# Patient Record
Sex: FEMALE | Race: WHITE
[De-identification: names, ages, dates, MRNs, and addresses within clinical notes are randomized per-mention and may not be internally consistent; named-entity substitution may affect disease eponyms.]

---

## 2022-03-23 ENCOUNTER — HOSPITAL ENCOUNTER (OUTPATIENT)
Dept: HOSPITAL 79 - KOH-I | Age: 57
End: 2022-03-23
Attending: INTERNAL MEDICINE
Payer: COMMERCIAL

## 2022-03-23 DIAGNOSIS — R91.8: ICD-10-CM

## 2022-03-23 DIAGNOSIS — F17.210: Primary | ICD-10-CM

## 2022-04-27 ENCOUNTER — OFFICE VISIT (OUTPATIENT)
Dept: PULMONOLOGY | Facility: CLINIC | Age: 57
End: 2022-04-27

## 2022-04-27 VITALS
WEIGHT: 175 LBS | BODY MASS INDEX: 29.16 KG/M2 | SYSTOLIC BLOOD PRESSURE: 128 MMHG | OXYGEN SATURATION: 98 % | HEIGHT: 65 IN | HEART RATE: 117 BPM | TEMPERATURE: 97.8 F | DIASTOLIC BLOOD PRESSURE: 74 MMHG

## 2022-04-27 DIAGNOSIS — R06.02 SOB (SHORTNESS OF BREATH): Primary | ICD-10-CM

## 2022-04-27 DIAGNOSIS — R53.83 FATIGUE, UNSPECIFIED TYPE: ICD-10-CM

## 2022-04-27 DIAGNOSIS — F17.200 SMOKER: ICD-10-CM

## 2022-04-27 PROCEDURE — 99203 OFFICE O/P NEW LOW 30 MIN: CPT | Performed by: INTERNAL MEDICINE

## 2022-04-27 RX ORDER — AMLODIPINE BESYLATE 5 MG/1
5 TABLET ORAL DAILY
COMMUNITY
Start: 2022-04-25

## 2022-04-27 RX ORDER — NICOTINE POLACRILEX 4 MG/1
GUM, CHEWING ORAL
COMMUNITY
Start: 2022-03-30

## 2022-04-27 RX ORDER — CITALOPRAM 20 MG/1
20 TABLET ORAL DAILY
COMMUNITY
Start: 2022-04-15

## 2022-04-27 NOTE — PROGRESS NOTES
Mamta Soriano presents for the following No chief complaint on file.  .    History of Present Illness   Were you born premature?  no    Any Childhood infections? no      Breathing problems when you were a child? no    Any childhood allergies?    yes             At what age did you begin smoking? Late 20's    Smoking marijuana? no    Any IV drugs? no    How many packs per day? 1/2 pack dly    Lung Function Test? no  Chest X-Ray? no    CT Chest? yes Allergy Test? no    Family hx of Lung disease or Lung Cancer?yes    If FHx is posivitive for lung cancer, what is the relationship of the family member? paternal uncle    Any hospitalization in the last year? no    How far can you walk without getting short of breath?  To 50 feet    Any coughing? no    Any wheezing? no    Acid Reflux? yes    Do you snore? yes    Daytime Fatigue? yes    Any pets? no   Any pet allergies? 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 no    What inhalers are you currently using? None   Above-mentioned questionnaire w  as reviewed by me in great detail.  No plans to quit smoking.  Recently had a CT chest we will get a copy of the results.  Review of Systems  Positive for cough and shortness of breath.  14 point review system reviewed and otherwise negative  Act.  Will luci Problems:  Problem List Items Addressed This Visit        Pulmonary and Pneumonias    SOB (shortness of breath) - Primary    Relevant Orders    Full Pulmonary Function Test With Bronchodilator       Symptoms and Signs    Fatigue    Relevant Orders    Home Sleep Study       Tobacco    Smoker          Past Medical History:  Past Medical History:   Diagnosis Date   • GERD (gastroesophageal reflux disease)    • Hypertension        Family History:  Family History   Problem Relation Age of Onset   • Hypertension Mother    • No Known Problems Father    • No Known Problems Sister    • No Known Problems Brother   "      Social History:  Social History     Tobacco Use   • Smoking status: Current Every Day Smoker     Packs/day: 0.50     Years: 25.00     Pack years: 12.50     Types: Cigarettes     Start date: 1/1/1997   • Smokeless tobacco: Never Used   Substance Use Topics   • Alcohol use: Never       Current Medications:  Current Outpatient Medications   Medication Sig Dispense Refill   • amLODIPine (NORVASC) 5 MG tablet Take 5 mg by mouth Daily. for blood pressure     • citalopram (CeleXA) 20 MG tablet Take 20 mg by mouth Daily. as directed     • Omeprazole 20 MG tablet delayed-release TAKE ONE TABLET BY MOUTH EVERY DAY FOR THE STOMACH       No current facility-administered medications for this visit.       Allergies:  No Known Allergies    Vitals:  /74 (BP Location: Right arm, Patient Position: Sitting, Cuff Size: Adult)   Pulse 117   Temp 97.8 °F (36.6 °C) (Infrared)   Ht 165.1 cm (65\")   Wt 79.4 kg (175 lb)   SpO2 98%   BMI 29.12 kg/m²     Imaging:    Imaging Results (Most Recent)     None          Pulmonary Functions Testing Results:    No results found for: FEV1, FVC, QNQ6BHT, TLC, DLCO    No results found for this or any previous visit.    Objective   Physical Exam  General- normal in appearance, not in any acute distress    HEENT- pupils equally reactive to light, normal in size, no scleral icterus    Neck-supple    Respiratory-respirations normal-on auscultation no wheezing no crackles,     Cardiovascular-  Normal S1 and S2. No S3, S4 or murmurs. No JVD, no carotid bruit and no edema, pulses normal bilaterally     GI-nontender nondistended bowel sounds positive    CNS-nonfocal    Musculoskeletal -no edema  Extremities- no obvious deformity noticed     Psychiatric-mood good, good eye contact, alert awake oriented  Skin- no visible rash       Assessment/Plan   Shortness of breath-likely related.  Related to patient's underlying lung disease\" longstanding smoking history less likely has COPD and " deconditioning.  We will get CT scan report from [facility] done at [facility].  We will get PFTs to look for any obstructive or restrictive lung disease.  We will prescribe inhalers accordingly.  If shortness of breath continues to be evidence of any significant disease we will get 2D echo.    Advised her to stay active and do exercise to improve deconditioning.    Lung cancer screening- we will repeat low-dose CT chest in another 10 months.    Smoker-did extensive smoking cessation counseling over 10 minutes.  Patient is not ready to quit yet.    Daytime fatigue and tiredness- we will get home sleep study.     ICD-10-CM ICD-9-CM   1. SOB (shortness of breath)  R06.02 786.05   2. Smoker  F17.200 305.1   3. Fatigue, unspecified type  R53.83 780.79       Return in about 3 months (around 7/27/2022).

## 2022-04-28 PROBLEM — R53.83 FATIGUE: Status: ACTIVE | Noted: 2022-04-28

## 2022-04-28 PROBLEM — F17.200 SMOKER: Status: ACTIVE | Noted: 2022-04-28

## 2022-04-28 PROBLEM — R06.02 SOB (SHORTNESS OF BREATH): Status: ACTIVE | Noted: 2022-04-28

## 2022-05-02 ENCOUNTER — TELEPHONE (OUTPATIENT)
Dept: PULMONOLOGY | Facility: CLINIC | Age: 57
End: 2022-05-02

## 2022-05-02 NOTE — TELEPHONE ENCOUNTER
----- Message from Seth Garcia MD sent at 4/30/2022  6:44 AM EDT -----  Has reed  Let her know   And give her autopap with supplies.    Ty  ss  ----- Message -----  From: Benjamin Canales Incoming  Sent: 4/29/2022  11:51 AM EDT  To: Seth Garcia MD

## 2022-05-04 ENCOUNTER — TELEPHONE (OUTPATIENT)
Dept: PULMONOLOGY | Facility: CLINIC | Age: 57
End: 2022-05-04

## 2022-05-04 DIAGNOSIS — G47.33 OSA (OBSTRUCTIVE SLEEP APNEA): Primary | ICD-10-CM

## 2022-05-04 NOTE — TELEPHONE ENCOUNTER
----- Message from Jocelyn Cortes MA sent at 5/2/2022 12:56 PM EDT -----    ----- Message -----  From: Seth Garcia MD  Sent: 4/30/2022   6:46 AM EDT  To: Grace Gaming, Jocelyn Cortes MA    Has reed  Let her know   And give her autopap with supplies.    Ty  ss  ----- Message -----  From: Benjamin Canales Incoming  Sent: 4/29/2022  11:51 AM EDT  To: Seth Garcia MD

## 2022-05-18 ENCOUNTER — HOSPITAL ENCOUNTER (OUTPATIENT)
Dept: RESPIRATORY THERAPY | Facility: HOSPITAL | Age: 57
Discharge: HOME | End: 2022-05-18
Admitting: INTERNAL MEDICINE

## 2022-05-18 DIAGNOSIS — R06.02 SOB (SHORTNESS OF BREATH): ICD-10-CM

## 2022-05-18 PROCEDURE — 94729 DIFFUSING CAPACITY: CPT

## 2022-05-18 PROCEDURE — 94729 DIFFUSING CAPACITY: CPT | Performed by: INTERNAL MEDICINE

## 2022-05-18 PROCEDURE — 94664 DEMO&/EVAL PT USE INHALER: CPT

## 2022-05-18 PROCEDURE — 94060 EVALUATION OF WHEEZING: CPT

## 2022-05-18 PROCEDURE — 94060 EVALUATION OF WHEEZING: CPT | Performed by: INTERNAL MEDICINE

## 2022-05-18 PROCEDURE — 94726 PLETHYSMOGRAPHY LUNG VOLUMES: CPT | Performed by: INTERNAL MEDICINE

## 2022-05-18 PROCEDURE — 94726 PLETHYSMOGRAPHY LUNG VOLUMES: CPT

## 2022-05-18 PROCEDURE — 94640 AIRWAY INHALATION TREATMENT: CPT

## 2022-05-18 RX ORDER — ALBUTEROL SULFATE 2.5 MG/3ML
2.5 SOLUTION RESPIRATORY (INHALATION) ONCE
Status: COMPLETED | OUTPATIENT
Start: 2022-05-18 | End: 2022-05-18

## 2022-05-18 RX ADMIN — ALBUTEROL SULFATE 2.5 MG: 2.5 SOLUTION RESPIRATORY (INHALATION) at 11:32

## 2023-04-12 ENCOUNTER — OFFICE VISIT (OUTPATIENT)
Dept: PULMONOLOGY | Facility: CLINIC | Age: 58
End: 2023-04-12
Payer: MEDICAID

## 2023-04-12 VITALS
TEMPERATURE: 98.7 F | WEIGHT: 182 LBS | BODY MASS INDEX: 28.56 KG/M2 | SYSTOLIC BLOOD PRESSURE: 172 MMHG | DIASTOLIC BLOOD PRESSURE: 98 MMHG | HEIGHT: 67 IN | HEART RATE: 113 BPM | OXYGEN SATURATION: 98 %

## 2023-04-12 DIAGNOSIS — F17.200 SMOKER: ICD-10-CM

## 2023-04-12 DIAGNOSIS — G47.33 MILD OBSTRUCTIVE SLEEP APNEA: Primary | ICD-10-CM

## 2023-04-12 DIAGNOSIS — Z12.2 SCREENING FOR LUNG CANCER: ICD-10-CM

## 2023-04-12 DIAGNOSIS — E66.3 OVERWEIGHT: ICD-10-CM

## 2023-04-12 PROCEDURE — 99214 OFFICE O/P EST MOD 30 MIN: CPT | Performed by: INTERNAL MEDICINE

## 2023-04-12 RX ORDER — HYDROXYZINE HYDROCHLORIDE 25 MG/1
25-50 TABLET, FILM COATED ORAL EVERY 6 HOURS PRN
COMMUNITY
Start: 2023-03-06

## 2023-04-12 RX ORDER — LORATADINE 10 MG/1
10 TABLET ORAL DAILY
COMMUNITY
Start: 2023-03-06

## 2023-04-12 NOTE — PROGRESS NOTES
Interval history since last visit: Needs CPAP    Recent hospitalizations: None    Investigations (imaging, PFT's, labs, sleep study, record requests, etc.) None    Have you had the Influenza Vaccine? no   Would you like to receive this Vaccine today? no    Have you had the Pneumonia Vaccine?  no  Would you like to receive this Vaccine today? no    Subjective    Julieta Soriano presents for the following Mediastinal lymphadenopathy and Sleep Apnea      History of Present Illness   No hospitalization and last 1 year.  Continues to smoke 1 pack a day.  Review of Systems   Constitutional: Negative for activity change, fatigue and unexpected weight change.   HENT: Negative for congestion, postnasal drip and rhinorrhea.    Respiratory: Negative for apnea, cough, chest tightness, shortness of breath and wheezing.    Cardiovascular: Negative for chest pain and palpitations.   Gastrointestinal: Negative for nausea.   Allergic/Immunologic: Negative for environmental allergies.   Psychiatric/Behavioral: Negative for agitation and confusion.       Active Problems:  Problem List Items Addressed This Visit        Endocrine and Metabolic    Overweight       Sleep    Mild obstructive sleep apnea - Primary    Relevant Orders    Miscellaneous DME       Tobacco    Smoker    Relevant Orders     CT Chest Low Dose Cancer Screening WO       Other    Screening for lung cancer       Past Medical History:  Past Medical History:   Diagnosis Date   • GERD (gastroesophageal reflux disease)    • Hypertension        Family History:  Family History   Problem Relation Age of Onset   • Hypertension Mother    • No Known Problems Father    • Cancer Sister    • No Known Problems Brother        Social History:  Social History     Tobacco Use   • Smoking status: Every Day     Packs/day: 0.50     Types: Cigarettes     Start date: 1/1/1997     Passive exposure: Current   • Smokeless tobacco: Never   Substance Use Topics   • Alcohol use: Yes     Alcohol/week:  "8.0 standard drinks     Types: 8 Cans of beer per week       Current Medications:  Current Outpatient Medications   Medication Sig Dispense Refill   • amLODIPine (NORVASC) 10 MG tablet Take 5 mg by mouth Daily. for blood pressure     • citalopram (CeleXA) 20 MG tablet Take 1 tablet by mouth Daily. as directed     • hydrOXYzine (ATARAX) 25 MG tablet Take 1-2 tablets by mouth Every 6 (Six) Hours As Needed.     • loratadine (CLARITIN) 10 MG tablet Take 1 tablet by mouth Daily. For allergies     • Omeprazole 20 MG tablet delayed-release TAKE ONE TABLET BY MOUTH EVERY DAY FOR THE STOMACH       No current facility-administered medications for this visit.       Allergies:  No Known Allergies    Vitals:  /98 (BP Location: Left arm, Patient Position: Sitting)   Pulse 113   Temp 98.7 °F (37.1 °C)   Ht 170.2 cm (67\")   Wt 82.6 kg (182 lb)   SpO2 98%   BMI 28.51 kg/m²     Imaging:    Imaging Results (Most Recent)     None          Pulmonary Functions Testing Results:    No results found for: FEV1, FVC, GQR6LID, TLC, DLCO    No results found for this or any previous visit.    Objective   Physical Exam  General- normal in appearance, not in any acute distress    HEENT- pupils equally reactive to light, normal in size, no scleral icterus    Neck-supple    Respiratory-respirations normal-on auscultation no wheezing no crackles,     Cardiovascular-  Normal S1 and S2. No S3, S4 or murmurs. No JVD, no carotid bruit and no edema, pulses normal bilaterally     GI-nontender nondistended bowel sounds positive    CNS-nonfocal    Musculoskeletal -no edema  Extremities- no obvious deformity noticed     Psychiatric-mood good, good eye contact, alert awake oriented  Skin- no visible rash       Assessment & Plan      Sleep apnea-mild-symptomatic-we will treat.  was educated about ill health effects of sleep apnea and what all effects it can have on health in long-term.  Which includes blood clots, arrhythmias, stroke, depression, " hypothyroidism. was also educated about the pathophysiology of sleep apnea and how weight contributes in it.  Patient understood the conversation well and will try and do exercise and eat right kind of food to lose weight.       Shortness of breath- likely related to deconditioning.  Much better.  Advised her to stay active and start some exercise program.  PFTs were obtained and were grossly normal.  Reviewed and discussed with patient.  Numbers were shown and discussed.  If shortness of breath gets worse we will consider 2D echo-to look for any cardiac etiology for her shortness of breath.    Abnormal CT chest- has been almost a year.  We will repeat low-dose CT chest.         Advised her to stay active and do exercise to improve deconditioning.     Lung cancer screening- we will repeat CT chest.     Smoker- did extensive smoking cessation counseling.  Patient is not ready to quit yet.  Will readdress on next visit.     Daytime fatigue and tiredness- mild sleep apnea-we will treat.    Overweight - did diet and exercise counseling.    Reviewed PFT, CT chest from the last visit and her sleep study.    ICD-10-CM ICD-9-CM   1. Mild obstructive sleep apnea  G47.33 327.23   2. Smoker  F17.200 305.1   3. Screening for lung cancer  Z12.2 V76.0   4. Overweight  E66.3 278.02       Return in about 3 months (around 7/12/2023).

## 2023-04-24 ENCOUNTER — TELEPHONE (OUTPATIENT)
Dept: CARDIOLOGY | Facility: CLINIC | Age: 58
End: 2023-04-24
Payer: MEDICAID

## 2023-04-24 ENCOUNTER — OFFICE VISIT (OUTPATIENT)
Dept: CARDIOLOGY | Facility: CLINIC | Age: 58
End: 2023-04-24
Payer: MEDICAID

## 2023-04-24 VITALS
HEIGHT: 67 IN | DIASTOLIC BLOOD PRESSURE: 91 MMHG | SYSTOLIC BLOOD PRESSURE: 171 MMHG | OXYGEN SATURATION: 97 % | HEART RATE: 109 BPM | WEIGHT: 189 LBS | BODY MASS INDEX: 29.66 KG/M2

## 2023-04-24 DIAGNOSIS — R00.2 PALPITATIONS: ICD-10-CM

## 2023-04-24 DIAGNOSIS — R06.02 SOB (SHORTNESS OF BREATH): ICD-10-CM

## 2023-04-24 DIAGNOSIS — R07.2 PRECORDIAL CHEST PAIN: Primary | ICD-10-CM

## 2023-04-24 DIAGNOSIS — E78.5 DYSLIPIDEMIA: ICD-10-CM

## 2023-04-24 DIAGNOSIS — I10 ESSENTIAL HYPERTENSION: ICD-10-CM

## 2023-04-24 DIAGNOSIS — G47.33 OSA (OBSTRUCTIVE SLEEP APNEA): ICD-10-CM

## 2023-04-24 PROCEDURE — 3077F SYST BP >= 140 MM HG: CPT | Performed by: SPECIALIST

## 2023-04-24 PROCEDURE — 99204 OFFICE O/P NEW MOD 45 MIN: CPT | Performed by: SPECIALIST

## 2023-04-24 PROCEDURE — 3080F DIAST BP >= 90 MM HG: CPT | Performed by: SPECIALIST

## 2023-04-24 PROCEDURE — 93000 ELECTROCARDIOGRAM COMPLETE: CPT | Performed by: SPECIALIST

## 2023-04-24 RX ORDER — ACETAMINOPHEN 160 MG
2000 TABLET,DISINTEGRATING ORAL DAILY
COMMUNITY

## 2023-04-24 RX ORDER — LOSARTAN POTASSIUM AND HYDROCHLOROTHIAZIDE 12.5; 5 MG/1; MG/1
1 TABLET ORAL DAILY
Qty: 90 TABLET | Refills: 1 | Status: SHIPPED | OUTPATIENT
Start: 2023-04-24

## 2023-04-24 RX ORDER — AMLODIPINE BESYLATE 10 MG/1
5 TABLET ORAL DAILY
Qty: 90 TABLET | Refills: 1 | Status: SHIPPED | OUTPATIENT
Start: 2023-04-24

## 2023-04-24 NOTE — TELEPHONE ENCOUNTER
Both of these test results are already in pt's chart.       ----- Message from Chloe Hansen MD sent at 4/24/2023  3:37 PM EDT -----  Please obtain the blood work from her primary care physician including her lipid profile  Please also obtain sleep study results which was done at her primary care physician thank you

## 2023-04-24 NOTE — PROGRESS NOTES
Subjective   Initial consultation, chest pain, HTN  Julieta Soriano is a 57 y.o. female who presents to day for Establish Care, Chest Pain (Intermittent ), and Shortness of Breath.    CHIEF COMPLIANT  Chief Complaint   Patient presents with   • Establish Care   • Chest Pain     Intermittent    • Shortness of Breath       Active Problems:  Problem List Items Addressed This Visit        Cardiac and Vasculature    Precordial chest pain - Primary    Relevant Medications    losartan-hydrochlorothiazide (Hyzaar) 50-12.5 MG per tablet    Palpitations    Dyslipidemia    Essential hypertension    Relevant Medications    losartan-hydrochlorothiazide (Hyzaar) 50-12.5 MG per tablet    amLODIPine (NORVASC) 10 MG tablet       Pulmonary and Pneumonias    SOB (shortness of breath)       Sleep    KHAIR (obstructive sleep apnea)       HPI  HPI  Patient since February has noticed that she getting occasional chest discomfort retrosternally on and off not particular related to exertion she was also short of breath at the time just walking to the mailbox but this has improved in the last few weeks no edema but she does sometimes wake up with heart racing this happened maybe twice since February she also feels very sleepy and tired and she snores quite a bit at night, her blood pressure has been quite elevated since at least October and she has no diabetes but she was told recently that her cholesterol is elevated but she has not been taking the medications yet, family history her maternal grandmother  with heart attack at the age of 95 her mother has history of hypertension, the patient herself has been smoking she is trying to quit now she is down to half a pack per day she has been smoking for almost 20 years she is using the nicotine patch  PRIOR MEDS  Current Outpatient Medications on File Prior to Visit   Medication Sig Dispense Refill   • Cholecalciferol (Vitamin D3) 50 MCG (2000) capsule Take 1 capsule by mouth Daily.     •  "citalopram (CeleXA) 20 MG tablet Take 1 tablet by mouth Daily. as directed     • hydrOXYzine (ATARAX) 25 MG tablet Take 1-2 tablets by mouth Every 6 (Six) Hours As Needed.     • loratadine (CLARITIN) 10 MG tablet Take 1 tablet by mouth Daily. For allergies     • Omeprazole 20 MG tablet delayed-release TAKE ONE TABLET BY MOUTH EVERY DAY FOR THE STOMACH     • [DISCONTINUED] amLODIPine (NORVASC) 10 MG tablet Take 5 mg by mouth Daily. for blood pressure       No current facility-administered medications on file prior to visit.       ALLERGIES  Patient has no known allergies.    HISTORY  Past Medical History:   Diagnosis Date   • GERD (gastroesophageal reflux disease)    • Hypertension        Social History     Socioeconomic History   • Marital status:    Tobacco Use   • Smoking status: Every Day     Packs/day: 0.50     Types: Cigarettes     Start date: 1/1/1997     Passive exposure: Current   • Smokeless tobacco: Never   Vaping Use   • Vaping Use: Never used   Substance and Sexual Activity   • Alcohol use: Yes     Alcohol/week: 8.0 standard drinks     Types: 8 Cans of beer per week   • Drug use: Never   • Sexual activity: Yes       Family History   Problem Relation Age of Onset   • Hypertension Mother    • No Known Problems Father    • Cancer Sister    • No Known Problems Brother        Review of Systems   Constitutional: Negative.    HENT: Negative.    Eyes: Negative.    Respiratory: Positive for chest tightness and shortness of breath. Negative for apnea, cough, choking, wheezing and stridor.    Cardiovascular: Positive for palpitations. Negative for chest pain and leg swelling.   Gastrointestinal: Negative.    Endocrine: Negative.    Genitourinary: Negative.    Skin: Negative.    Allergic/Immunologic: Negative.    Neurological: Negative.    Hematological: Negative.    Psychiatric/Behavioral: Negative.        Objective     VITALS: /91   Pulse 109   Ht 170.2 cm (67\")   Wt 85.7 kg (189 lb)   SpO2 97%   " BMI 29.60 kg/m²     LABS:   Lab Results (most recent)     None          IMAGING:   No Images in the past 120 days found..    EXAM:  Physical Exam  Vitals reviewed.   Constitutional:       Appearance: She is well-developed.   HENT:      Head: Normocephalic and atraumatic.   Eyes:      Pupils: Pupils are equal, round, and reactive to light.   Neck:      Thyroid: No thyromegaly.      Vascular: No JVD.   Cardiovascular:      Rate and Rhythm: Normal rate and regular rhythm.      Heart sounds: Normal heart sounds. No murmur heard.    No friction rub. No gallop.   Pulmonary:      Effort: Pulmonary effort is normal. No respiratory distress.      Breath sounds: Normal breath sounds. No stridor. No wheezing or rales.   Chest:      Chest wall: No tenderness.   Musculoskeletal:         General: No tenderness or deformity.      Cervical back: Neck supple.   Skin:     General: Skin is warm and dry.   Neurological:      Mental Status: She is alert and oriented to person, place, and time.      Cranial Nerves: No cranial nerve deficit.      Coordination: Coordination normal.         Procedure     ECG 12 Lead    Date/Time: 4/24/2023 3:04 PM  Performed by: Chloe Hansen MD  Authorized by: Chloe Hansen MD           EKG: Normal sinus rhythm with RSR pattern otherwise unremarkable EKG, no previous EKGs available for comparison       Assessment & Plan     Diagnoses and all orders for this visit:    1. Precordial chest pain (Primary)  -     losartan-hydrochlorothiazide (Hyzaar) 50-12.5 MG per tablet; Take 1 tablet by mouth Daily.  Dispense: 90 tablet; Refill: 1    2. SOB (shortness of breath)    3. Palpitations    4. Dyslipidemia    5. KHARI (obstructive sleep apnea)    6. Essential hypertension  -     amLODIPine (NORVASC) 10 MG tablet; Take 0.5 tablets by mouth Daily. for blood pressure  Dispense: 90 tablet; Refill: 1    Other orders  -     ECG 12 Lead    1.  I am concerned about her chest pain so I am going to go ahead and proceed with  a stress testing for assessment of ischemia  2.  We will get an echocardiac to assess cardiac function wall motion valve morphology  3.  She had 2 episodes of palpitations since February if this is getting any worse we will proceed with monitoring for assessment of arrhythmia  4.  Her symptoms are consistent with sleep apnea she had a sleep study the result is not available yet  5.  She was told by her primary care physician that she has hyperlipidemia she is not on a statin yet I will try to get the report  6.  Her blood pressure is quite elevated I am going to add Hyzaar 50/12.5 mg  7.  She is in the way of quitting smoking she said she is using nicotine patch hopefully should be able to quit within the week or so    Return After stress test.               MEDS ORDERED DURING VISIT:  New Medications Ordered This Visit   Medications   • losartan-hydrochlorothiazide (Hyzaar) 50-12.5 MG per tablet     Sig: Take 1 tablet by mouth Daily.     Dispense:  90 tablet     Refill:  1   • amLODIPine (NORVASC) 10 MG tablet     Sig: Take 0.5 tablets by mouth Daily. for blood pressure     Dispense:  90 tablet     Refill:  1       As always, Maureen Pulliam MD  I appreciate very much the opportunity to participate in the cardiovascular care of your patients. Please do not hesitate to call me with any questions with regards to Julieta Soriano evaluation and management.         This document has been electronically signed by Chloe Hansen MD  April 24, 2023 15:38 EDT    This note is dictated utilizing voice recognition software.

## 2023-04-24 NOTE — Clinical Note
Please obtain the blood work from her primary care physician including her lipid profile Please also obtain sleep study results which was done at her primary care physician thank you

## 2023-05-11 ENCOUNTER — HOSPITAL ENCOUNTER (OUTPATIENT)
Dept: CT IMAGING | Facility: HOSPITAL | Age: 58
Discharge: HOME | End: 2023-05-11
Payer: MEDICAID

## 2023-05-11 DIAGNOSIS — F17.200 SMOKER: ICD-10-CM

## 2023-05-11 PROCEDURE — 71271 CT THORAX LUNG CANCER SCR C-: CPT | Performed by: RADIOLOGY

## 2023-05-11 PROCEDURE — 71271 CT THORAX LUNG CANCER SCR C-: CPT

## 2023-05-16 DIAGNOSIS — R91.8 MULTIPLE PULMONARY NODULES: Primary | ICD-10-CM

## 2023-05-30 ENCOUNTER — OFFICE VISIT (OUTPATIENT)
Dept: CARDIOLOGY | Facility: CLINIC | Age: 58
End: 2023-05-30

## 2023-05-30 VITALS
OXYGEN SATURATION: 97 % | HEART RATE: 99 BPM | BODY MASS INDEX: 29.88 KG/M2 | HEIGHT: 67 IN | SYSTOLIC BLOOD PRESSURE: 150 MMHG | WEIGHT: 190.4 LBS | DIASTOLIC BLOOD PRESSURE: 94 MMHG

## 2023-05-30 DIAGNOSIS — R06.02 SOB (SHORTNESS OF BREATH): ICD-10-CM

## 2023-05-30 DIAGNOSIS — Z72.0 TOBACCO ABUSE: ICD-10-CM

## 2023-05-30 DIAGNOSIS — G47.33 OSA (OBSTRUCTIVE SLEEP APNEA): ICD-10-CM

## 2023-05-30 DIAGNOSIS — I10 ESSENTIAL HYPERTENSION: Primary | ICD-10-CM

## 2023-05-30 PROCEDURE — 1159F MED LIST DOCD IN RCRD: CPT | Performed by: NURSE PRACTITIONER

## 2023-05-30 PROCEDURE — 1160F RVW MEDS BY RX/DR IN RCRD: CPT | Performed by: NURSE PRACTITIONER

## 2023-05-30 PROCEDURE — 3077F SYST BP >= 140 MM HG: CPT | Performed by: NURSE PRACTITIONER

## 2023-05-30 PROCEDURE — 99214 OFFICE O/P EST MOD 30 MIN: CPT | Performed by: NURSE PRACTITIONER

## 2023-05-30 PROCEDURE — 3080F DIAST BP >= 90 MM HG: CPT | Performed by: NURSE PRACTITIONER

## 2023-05-30 RX ORDER — LOSARTAN POTASSIUM AND HYDROCHLOROTHIAZIDE 12.5; 1 MG/1; MG/1
1 TABLET ORAL DAILY
Qty: 60 TABLET | Refills: 3 | Status: SHIPPED | OUTPATIENT
Start: 2023-05-30

## 2023-05-30 NOTE — PROGRESS NOTES
[facility]             SofiKIMBERLY Ricketts Gina Leanne, MD  Julieta Soriano  1965 05/30/2023    Patient Active Problem List   Diagnosis   • SOB (shortness of breath)   • Tobacco abuse   • Fatigue   • Screening for lung cancer   • KHARI (obstructive sleep apnea)   • Overweight   • Precordial chest pain   • Palpitations   • Dyslipidemia   • Essential hypertension       Dear Maureen Pulliam MD:    Subjective     Chief Complaint   Patient presents with   • Follow-up     routine       HPI:     This is a 57 y.o. female with known past medical history of essential pretension, dyslipidemia and tobacco abuse.    Julieta Soriano presents today for routine cardiology follow up.  Patient states since her last visit she has been doing overall well.  Does report she has been having issues with getting her blood pressure controlled.  Blood pressure elevated in the office today.  Does not check blood pressure at home.  Per last office note patient was scheduled to have stress test and echocardiogram however it does not appear that orders were placed for this therefore she was unable to have it done.  Denies any chest pain.  She follows with pulmonology for which low-dose CT was ordered which showed a stable 4 mm pulmonary nodule.    • Diagnostic Testing  1. NONE     All other systems were reviewed and were negative.    Patient Active Problem List   Diagnosis   • SOB (shortness of breath)   • Tobacco abuse   • Fatigue   • Screening for lung cancer   • KHARI (obstructive sleep apnea)   • Overweight   • Precordial chest pain   • Palpitations   • Dyslipidemia   • Essential hypertension       family history includes Cancer in her sister; Hypertension in her mother; No Known Problems in her brother and father.     reports that she has been smoking cigarettes. She started smoking about 26 years ago. She has been smoking an average of .5 packs per day. She has been exposed to tobacco  smoke. She has never used smokeless tobacco. She reports current alcohol use of about 8.0 standard drinks per week. She reports that she does not use drugs.    No Known Allergies      Current Outpatient Medications:   •  amLODIPine (NORVASC) 10 MG tablet, Take 0.5 tablets by mouth Daily. for blood pressure, Disp: 90 tablet, Rfl: 1  •  Cholecalciferol (Vitamin D3) 50 MCG (2000 UT) capsule, Take 1 capsule by mouth Daily., Disp: , Rfl:   •  citalopram (CeleXA) 20 MG tablet, Take 1 tablet by mouth Daily. as directed, Disp: , Rfl:   •  loratadine (CLARITIN) 10 MG tablet, Take 1 tablet by mouth Daily. For allergies, Disp: , Rfl:   •  Omeprazole 20 MG tablet delayed-release, TAKE ONE TABLET BY MOUTH EVERY DAY FOR THE STOMACH, Disp: , Rfl:   •  hydrOXYzine (ATARAX) 25 MG tablet, Take 1-2 tablets by mouth Every 6 (Six) Hours As Needed. (Patient not taking: Reported on 5/30/2023), Disp: , Rfl:   •  losartan-hydrochlorothiazide (Hyzaar) 100-12.5 MG per tablet, Take 1 tablet by mouth Daily., Disp: 60 tablet, Rfl: 3      Physical Exam:  I have reviewed the patient's current vital signs as documented in the patient's EMR.   Vitals:    05/30/23 1458   BP: 150/94   Pulse: 99   SpO2: 97%     Body mass index is 29.81 kg/m².       05/30/23  1458   Weight: 86.4 kg (190 lb 6.4 oz)      Physical Exam  Constitutional:       General: She is not in acute distress.     Appearance: Normal appearance. She is well-developed and normal weight.   HENT:      Head: Normocephalic and atraumatic.   Eyes:      General: Lids are normal.      Conjunctiva/sclera: Conjunctivae normal.      Pupils: Pupils are equal, round, and reactive to light.   Neck:      Vascular: No carotid bruit or JVD.   Cardiovascular:      Rate and Rhythm: Normal rate and regular rhythm.      Pulses: Normal pulses.      Heart sounds: Normal heart sounds, S1 normal and S2 normal. No murmur heard.  Pulmonary:      Effort: Pulmonary effort is normal. No respiratory distress.       Breath sounds: Normal breath sounds. No wheezing.   Abdominal:      General: Bowel sounds are normal. There is no distension.      Palpations: Abdomen is soft. There is no hepatomegaly or splenomegaly.      Tenderness: There is no abdominal tenderness.   Musculoskeletal:         General: No swelling. Normal range of motion.      Cervical back: Normal range of motion and neck supple.      Right lower leg: No edema.      Left lower leg: No edema.   Skin:     General: Skin is warm and dry.      Coloration: Skin is not jaundiced.      Findings: No rash.   Neurological:      General: No focal deficit present.      Mental Status: She is alert and oriented to person, place, and time. Mental status is at baseline.   Psychiatric:         Mood and Affect: Mood normal.         Speech: Speech normal.         Behavior: Behavior normal.         Thought Content: Thought content normal.         Judgment: Judgment normal.            DATA REVIEWED:     TTE/CURTIS:      Laboratory evaluations:    No results found for: GLUCOSE, BUN, CREATININE, EGFRIFNONA, EGFRIFAFRI, BCR, K, CO2, CALCIUM, PROTENTOTREF, ALBUMIN, LABIL2, BILIRUBIN, AST, ALT  No results found for: WBC, HGB, HCT, MCV, PLT  No results found for: CHOL, CHLPL, TRIG, HDL, LDL, LDLDIRECT  No results found for: TSH, D4SQCPG, O7DSMPS, THYROIDAB  No results found for: HGBA1C  No results found for: ALT  No results found for: HGBA1C  No results found for: GLUF, MICROALBUR, CREATININE  No results found for: IRON, TIBC, FERRITIN  No results found for: INR, PROTIME        --------------------------------------------------------------------------------------------------------------------------    ASSESSMENT/PLAN:      Diagnosis Plan   1. Essential hypertension  losartan-hydrochlorothiazide (Hyzaar) 100-12.5 MG per tablet      2. Tobacco abuse        3. SOB (shortness of breath)  Adult Transthoracic Echo Complete w/ Color, Spectral and Contrast if necessary per protocol      4. KHARI  (obstructive sleep apnea)            1. Essential hypertension  • Blood pressure elevated in the office today.  Recent BMP March 2023 showed stable kidney function.  Increase Hyzaar to 100-12.5 mg daily.  Instructed patient to drink adequate water.  Advise low-sodium diet along with diet and exercise.  Advised her to keep a log of blood pressure and to call the office if blood pressure remains over 140 systolic.  • Continue amlodipine.    2. Obstructive sleep apnea  • Follows with pulmonology.    3.  Shortness of breath  · Patient was to be scheduled for stress test and echocardiogram at last visit however it does not appear that orders were placed for this therefore we will obtain echocardiogram.  Denies any recent chest pain therefore we will hold off on further ischemic evaluation at this time.    4.  Tobacco abuse  · Continues to smoke.  Discussed with patient about the importance of smoking cessation to reduce his risk of cardiovascular disease.      This document has been @Electronically signed by KIMBERLY Friend, 05/30/23, 1:37 PM EDT.       Dictated Utilizing Dragon Dictation: Part of this note may be an electronic transcription/translation of spoken language to printed text using the Dragon Dictation System.    Follow-up appointment and medication changes provided in hand delivered After Visit Summary as well as reviewed in the room.

## 2023-06-01 ENCOUNTER — TELEPHONE (OUTPATIENT)
Dept: CARDIOLOGY | Facility: CLINIC | Age: 58
End: 2023-06-01

## 2023-06-01 NOTE — TELEPHONE ENCOUNTER
Requested.     ----- Message from KIMBERLY Guajardo sent at 5/30/2023  3:10 PM EDT -----  Please request sleep study from PCP

## 2023-06-06 ENCOUNTER — HOSPITAL ENCOUNTER (OUTPATIENT)
Dept: CARDIOLOGY | Facility: HOSPITAL | Age: 58
Discharge: HOME | End: 2023-06-06
Admitting: NURSE PRACTITIONER
Payer: MEDICAID

## 2023-06-06 DIAGNOSIS — R06.02 SOB (SHORTNESS OF BREATH): ICD-10-CM

## 2023-06-06 LAB
BH CV ECHO MEAS - ACS: 1.6 CM
BH CV ECHO MEAS - AO MAX PG: 9.6 MMHG
BH CV ECHO MEAS - AO MEAN PG: 5 MMHG
BH CV ECHO MEAS - AO ROOT DIAM: 3 CM
BH CV ECHO MEAS - AO V2 MAX: 155 CM/SEC
BH CV ECHO MEAS - AO V2 VTI: 29.6 CM
BH CV ECHO MEAS - EDV(CUBED): 54.9 ML
BH CV ECHO MEAS - EDV(MOD-SP4): 46.2 ML
BH CV ECHO MEAS - EF(MOD-SP4): 70.6 %
BH CV ECHO MEAS - ESV(CUBED): 17.6 ML
BH CV ECHO MEAS - ESV(MOD-SP4): 13.6 ML
BH CV ECHO MEAS - FS: 31.6 %
BH CV ECHO MEAS - IVS/LVPW: 1 CM
BH CV ECHO MEAS - IVSD: 1 CM
BH CV ECHO MEAS - LA DIMENSION: 3 CM
BH CV ECHO MEAS - LAT PEAK E' VEL: 9.9 CM/SEC
BH CV ECHO MEAS - LV DIASTOLIC VOL/BSA (35-75): 29.6 CM2
BH CV ECHO MEAS - LV MASS(C)D: 117.3 GRAMS
BH CV ECHO MEAS - LV SYSTOLIC VOL/BSA (12-30): 8.7 CM2
BH CV ECHO MEAS - LVIDD: 3.8 CM
BH CV ECHO MEAS - LVIDS: 2.6 CM
BH CV ECHO MEAS - LVOT AREA: 3.5 CM2
BH CV ECHO MEAS - LVOT DIAM: 2.1 CM
BH CV ECHO MEAS - LVPWD: 1 CM
BH CV ECHO MEAS - MED PEAK E' VEL: 7.3 CM/SEC
BH CV ECHO MEAS - MV A MAX VEL: 93.4 CM/SEC
BH CV ECHO MEAS - MV E MAX VEL: 68.3 CM/SEC
BH CV ECHO MEAS - MV E/A: 0.73
BH CV ECHO MEAS - PA ACC TIME: 0.12 SEC
BH CV ECHO MEAS - PA PR(ACCEL): 26.8 MMHG
BH CV ECHO MEAS - SI(MOD-SP4): 20.9 ML/M2
BH CV ECHO MEAS - SV(MOD-SP4): 32.6 ML
BH CV ECHO MEAS - TAPSE (>1.6): 2.12 CM
BH CV ECHO MEASUREMENTS AVERAGE E/E' RATIO: 7.94
LEFT ATRIUM VOLUME INDEX: 22.6 ML/M2

## 2023-06-06 PROCEDURE — 93306 TTE W/DOPPLER COMPLETE: CPT

## 2023-06-06 PROCEDURE — 93306 TTE W/DOPPLER COMPLETE: CPT | Performed by: SPECIALIST

## 2023-07-26 ENCOUNTER — OFFICE VISIT (OUTPATIENT)
Dept: PULMONOLOGY | Facility: CLINIC | Age: 58
End: 2023-07-26
Payer: MEDICAID

## 2023-07-26 VITALS
WEIGHT: 180 LBS | TEMPERATURE: 98.7 F | SYSTOLIC BLOOD PRESSURE: 124 MMHG | HEART RATE: 119 BPM | HEIGHT: 66 IN | DIASTOLIC BLOOD PRESSURE: 82 MMHG | BODY MASS INDEX: 28.93 KG/M2 | OXYGEN SATURATION: 97 %

## 2023-07-26 DIAGNOSIS — G47.33 MILD OBSTRUCTIVE SLEEP APNEA: ICD-10-CM

## 2023-07-26 DIAGNOSIS — R91.8 MULTIPLE PULMONARY NODULES: Primary | ICD-10-CM

## 2023-07-26 DIAGNOSIS — F17.210 CIGARETTE NICOTINE DEPENDENCE WITHOUT COMPLICATION: ICD-10-CM

## 2023-07-26 PROCEDURE — 3074F SYST BP LT 130 MM HG: CPT | Performed by: PHYSICIAN ASSISTANT

## 2023-07-26 PROCEDURE — 1159F MED LIST DOCD IN RCRD: CPT | Performed by: PHYSICIAN ASSISTANT

## 2023-07-26 PROCEDURE — 99214 OFFICE O/P EST MOD 30 MIN: CPT | Performed by: PHYSICIAN ASSISTANT

## 2023-07-26 PROCEDURE — 3079F DIAST BP 80-89 MM HG: CPT | Performed by: PHYSICIAN ASSISTANT

## 2023-07-26 PROCEDURE — 1160F RVW MEDS BY RX/DR IN RCRD: CPT | Performed by: PHYSICIAN ASSISTANT

## 2023-07-26 NOTE — PROGRESS NOTES
"Chief Complaint  Mild obstructive sleep apnea    Subjective        Julieta Soriano presents to [facility]  History of Present Illness    Mrs. Soriano presents today for follow up. Previous imaging notable for pulmonary nodules and had repeat CT chest imaging since the last visit.   Denies any issues with shortness of breath.    Did not receive the AutoPap (it was previously ordered at the last visit). Notable for mild sleep apnea on previous testing. Notable for snoring, some daytime fatigue.  After previous order.  No hemoptysis, recent fevers.   Remains a current, daily smoker.     Objective   Vital Signs:  /82 (BP Location: Left arm, Patient Position: Sitting)   Pulse 119   Temp 98.7 °F (37.1 °C)   Ht 167.6 cm (66\")   Wt 81.6 kg (180 lb)   SpO2 97%   BMI 29.05 kg/m²   Estimated body mass index is 29.05 kg/m² as calculated from the following:    Height as of this encounter: 167.6 cm (66\").    Weight as of this encounter: 81.6 kg (180 lb).         Physical Exam  Vitals reviewed.   Constitutional:       General: She is not in acute distress.     Appearance: She is well-developed. She is not diaphoretic.   HENT:      Head: Normocephalic and atraumatic.   Cardiovascular:      Rate and Rhythm: Normal rate and regular rhythm.      Heart sounds: Normal heart sounds, S1 normal and S2 normal.   Pulmonary:      Effort: Pulmonary effort is normal.      Breath sounds: No wheezing, rhonchi or rales.   Neurological:      Mental Status: She is alert and oriented to person, place, and time.   Psychiatric:         Behavior: Behavior normal.      Result Review :  The following data was reviewed by: Andree Mosquera PA-C on 07/26/2023:      Reviewed the CT chest imaging/report from May 2023.   4 mm right pulmonary noncalcifed nodule.   Other left sided nodule likely nearly resolved, now only notable for 2 mm calcified appearing nodule of the left periphery.     Reviewed CT " chest report from March 2022 - also notable for pulmonary nodule. Mentioned left and right nodule at this time.     Reviewed sleep study report - mild sleep apnea.        Assessment and Plan   Diagnoses and all orders for this visit:    1. Multiple pulmonary nodules (Primary)    2. Mild obstructive sleep apnea    3. Cigarette nicotine dependence without complication        Pulmonary nodules:  Tiny right pulmonary nodule measuring 4 mm stable over the past year (compared CT scan report to current CT chest imaging).   Other previous left-sided nodule likely nearly resolved, now only notable for a 2 mm calcified nodule in the left periphery on image 43.  Due to stability and no new nodules present, can consider repeat imaging in 1.5 to 2 years.  However, can consider low-dose screenings yearly due to current smoking history.  Will order at a later time.       Cigarette dependence:  She hopes to achieve cessation, and no request for any specific assistive medications/replacement products today.  Encouraged cessation possible.  No shortness of breath.  Can reassess and treat as needed if this occurs.  Some likely early air trapping changes noted on CT imaging        Obstructive sleep apnea:  Did not receive the AutoPap as previously ordered.   We will reorder, will hopefully receive and can assess use at the next visit.  She is willing to retry the device (previously awaited in 2022, but had approval by [facility]).     Management obstructive sleep apnea also includes lifestyle modifications including weight loss, avoidance of alcohol, sedated, caffeine especially before bedtime, allowing adequate sleep time, and body position during sleep such as side versus back. 10% weight loss can result in a 50% improvement of the apnea-hypopnea index.  Untreated sleep apnea can lead to cardiovascular/metabolic disorder, neurocognitive deficit, daytime sleepiness, motor vehicle accidents, depression, mood disorders and  reduced quality of life.  Patient understands the risk of untreated obstructive sleep apnea and benefit benefits of the treatment. Recommended at least 4 hours of use per night for 70% of every month (approximately 21 out of 30 days) per CMS guidelines.         Follow Up   Return in about 6 weeks (around 9/6/2023), or if symptoms worsen or fail to improve, for Recheck.  Patient was given instructions and counseling regarding her condition or for health maintenance advice. Please see specific information pulled into the AVS if appropriate.

## 2023-07-28 PROBLEM — R91.8 MULTIPLE PULMONARY NODULES: Status: ACTIVE | Noted: 2023-07-28

## 2023-11-07 ENCOUNTER — TRANSCRIBE ORDERS (OUTPATIENT)
Dept: ADMINISTRATIVE | Facility: HOSPITAL | Age: 58
End: 2023-11-07
Payer: MEDICAID

## 2023-11-07 DIAGNOSIS — R74.8 ELEVATED LIVER ENZYMES: Primary | ICD-10-CM

## 2024-01-25 DIAGNOSIS — I10 ESSENTIAL HYPERTENSION: ICD-10-CM

## 2024-01-25 RX ORDER — LOSARTAN POTASSIUM AND HYDROCHLOROTHIAZIDE 12.5; 1 MG/1; MG/1
1 TABLET ORAL DAILY
Qty: 60 TABLET | Refills: 3 | Status: SHIPPED | OUTPATIENT
Start: 2024-01-25

## 2024-05-13 ENCOUNTER — HOSPITAL ENCOUNTER (OUTPATIENT)
Dept: CT IMAGING | Facility: HOSPITAL | Age: 59
Discharge: HOME | End: 2024-05-13
Admitting: INTERNAL MEDICINE
Payer: MEDICAID

## 2024-05-13 DIAGNOSIS — R91.8 MULTIPLE PULMONARY NODULES: ICD-10-CM

## 2024-05-13 PROCEDURE — 71271 CT THORAX LUNG CANCER SCR C-: CPT

## 2024-05-14 PROCEDURE — 71271 CT THORAX LUNG CANCER SCR C-: CPT | Performed by: RADIOLOGY

## 2024-05-23 ENCOUNTER — TRANSCRIBE ORDERS (OUTPATIENT)
Dept: ADMINISTRATIVE | Facility: HOSPITAL | Age: 59
End: 2024-05-23
Payer: MEDICAID

## 2024-05-23 DIAGNOSIS — R74.8 ACID PHOSPHATASE ELEVATED: Primary | ICD-10-CM

## 2024-07-23 ENCOUNTER — HOSPITAL ENCOUNTER (OUTPATIENT)
Dept: ULTRASOUND IMAGING | Facility: HOSPITAL | Age: 59
Discharge: HOME | End: 2024-07-23
Payer: MEDICAID

## 2024-07-23 ENCOUNTER — HOSPITAL ENCOUNTER (OUTPATIENT)
Dept: MAMMOGRAPHY | Facility: HOSPITAL | Age: 59
Discharge: HOME | End: 2024-07-23
Payer: MEDICAID

## 2024-07-23 DIAGNOSIS — R92.8 ABNORMAL MAMMOGRAM: ICD-10-CM

## 2024-07-23 PROCEDURE — 76642 ULTRASOUND BREAST LIMITED: CPT

## 2024-07-23 PROCEDURE — 76642 ULTRASOUND BREAST LIMITED: CPT | Performed by: RADIOLOGY

## 2024-07-23 PROCEDURE — 77061 BREAST TOMOSYNTHESIS UNI: CPT | Performed by: RADIOLOGY

## 2024-07-23 PROCEDURE — 77065 DX MAMMO INCL CAD UNI: CPT | Performed by: RADIOLOGY

## 2024-07-23 PROCEDURE — 77065 DX MAMMO INCL CAD UNI: CPT

## 2024-07-23 PROCEDURE — G0279 TOMOSYNTHESIS, MAMMO: HCPCS

## 2024-07-24 ENCOUNTER — HOSPITAL ENCOUNTER (OUTPATIENT)
Dept: MAMMOGRAPHY | Facility: HOSPITAL | Age: 59
Discharge: HOME | End: 2024-07-24
Payer: MEDICAID

## 2024-07-24 DIAGNOSIS — R92.8 ABNORMAL MAMMOGRAM: ICD-10-CM

## 2024-07-24 PROCEDURE — A4648 IMPLANTABLE TISSUE MARKER: HCPCS

## 2024-07-24 PROCEDURE — 77065 DX MAMMO INCL CAD UNI: CPT | Performed by: RADIOLOGY

## 2024-07-24 PROCEDURE — 19081 BX BREAST 1ST LESION STRTCTC: CPT | Performed by: RADIOLOGY

## 2024-07-25 ENCOUNTER — TELEPHONE (OUTPATIENT)
Dept: MAMMOGRAPHY | Facility: HOSPITAL | Age: 59
End: 2024-07-25
Payer: MEDICAID

## 2024-07-25 LAB — REF LAB TEST METHOD: NORMAL

## 2024-07-25 NOTE — TELEPHONE ENCOUNTER
Attempted to notify patient of breast biopsy results and recommendations, no answer. Will attempt again at later time.

## 2024-07-25 NOTE — TELEPHONE ENCOUNTER
Patient notified of breast biopsy results and recommendations. Encouraged patient to call with further needs. Patient to follow up with Dr. Pearson 8-1-24.      ----- Message from Melissa Disla sent at 7/25/2024 12:58 PM EDT -----  PATHOLOGY:    Fibrocystic change, characterized by stromal fibrosis and mild intraductal hyperplasia of usual type. Focal subtle pseudoangiomatous stromal hyperplasia. Multiple benign calcifications. No malignancy notified.    The pathology result is not concordant with the imaging findings. Recommend surgical consultation and excisional biopsy of the entire area of abnormal appearing tissue.    The patient will be notified of the results and recommendations by our breast care nurse.

## 2024-08-01 ENCOUNTER — TELEPHONE (OUTPATIENT)
Dept: SURGERY | Facility: CLINIC | Age: 59
End: 2024-08-01

## 2024-08-01 ENCOUNTER — OFFICE VISIT (OUTPATIENT)
Dept: SURGERY | Facility: CLINIC | Age: 59
End: 2024-08-01
Payer: MEDICAID

## 2024-08-01 VITALS
WEIGHT: 189.4 LBS | BODY MASS INDEX: 29.73 KG/M2 | SYSTOLIC BLOOD PRESSURE: 148 MMHG | DIASTOLIC BLOOD PRESSURE: 84 MMHG | HEIGHT: 67 IN | HEART RATE: 78 BPM

## 2024-08-01 DIAGNOSIS — R92.8 ABNORMAL MAMMOGRAM: Primary | ICD-10-CM

## 2024-08-01 PROCEDURE — 99244 OFF/OP CNSLTJ NEW/EST MOD 40: CPT | Performed by: SURGERY

## 2024-08-01 PROCEDURE — 1160F RVW MEDS BY RX/DR IN RCRD: CPT | Performed by: SURGERY

## 2024-08-01 PROCEDURE — 3077F SYST BP >= 140 MM HG: CPT | Performed by: SURGERY

## 2024-08-01 PROCEDURE — 3079F DIAST BP 80-89 MM HG: CPT | Performed by: SURGERY

## 2024-08-01 PROCEDURE — 1159F MED LIST DOCD IN RCRD: CPT | Performed by: SURGERY

## 2024-08-01 RX ORDER — ATORVASTATIN CALCIUM 20 MG/1
20 TABLET, FILM COATED ORAL DAILY
COMMUNITY
Start: 2024-07-16

## 2024-08-01 RX ORDER — CARVEDILOL 6.25 MG/1
TABLET ORAL
COMMUNITY
Start: 2024-07-16

## 2024-08-01 NOTE — PROGRESS NOTES
Subjective   Julieat Soriano is a 58 y.o. female here today for pathology review.    History of Present Illness  Ms. Soriano was seen in the office today for breast evaluation.  Patient underwent a screening mammogram at an outside facility and was referred to the breast center for an asymmetric density in the right breast.  On 7/23/2024 the patient underwent a right diagnostic mammogram and right breast ultrasound which demonstrated an irregular asymmetry in the posterior right 9-10 o'clock with possible architectural distortion anteriorly and pleomorphic calcifications.  In aggregate the area spanned 4 cm of tissue.  No convincing correlate was identified on ultrasound.  Stereotactic biopsy was performed on 7/20/2024 which demonstrated benign concordant findings for which surgical biopsy was recommended.  Biopsy clip was noted to be medial to the biopsy site.  Patient denies a palpable mass or nipple discharge.  There is no prior history of breast biopsy or cyst aspiration.  No family history of breast or ovarian cancer.  The patient is postmenopausal and not on hormone replacement therapy.  No Known Allergies  Current Outpatient Medications   Medication Sig Dispense Refill    amLODIPine (NORVASC) 10 MG tablet Take 0.5 tablets by mouth Daily. for blood pressure 90 tablet 1    atorvastatin (LIPITOR) 20 MG tablet Take 1 tablet by mouth Daily. for cholesterol      carvedilol (COREG) 6.25 MG tablet TAKE ONE TABLET BY MOUTH TWO TIMES PER DAY FOR BLOOD PRESSURE      Cholecalciferol (Vitamin D3) 50 MCG (2000 UT) capsule Take 1 capsule by mouth Daily.      citalopram (CeleXA) 20 MG tablet Take 1 tablet by mouth Daily. as directed      loratadine (CLARITIN) 10 MG tablet Take 1 tablet by mouth Daily. For allergies      losartan-hydrochlorothiazide (HYZAAR) 100-12.5 MG per tablet TAKE ONE TABLET BY MOUTH EVERY DAY AS DIRECTED 60 tablet 3    Omeprazole 20 MG tablet delayed-release TAKE ONE TABLET BY MOUTH EVERY DAY FOR THE STOMACH    "    No current facility-administered medications for this visit.     Past Medical History:   Diagnosis Date    GERD (gastroesophageal reflux disease)     Hypertension     Sleep apnea      Past Surgical History:   Procedure Laterality Date    HYSTERECTOMY         Pertinent Review of Systems:  Respiratory: no shortness of breath  Cardiovascular: no chest pain  Other pertinent:      Objective   /84 (BP Location: Left arm)   Pulse 78   Ht 170.2 cm (67\")   Wt 85.9 kg (189 lb 6.4 oz)   BMI 29.66 kg/m²   Physical Exam  Well-developed well-nourished female  Neck:  No supraclavicular adenopathy  Lungs:  Respiratory effort normal. Auscultation: Clear, without wheezes, rhonchi, rales  Heart:  Regular rate and rhythm, without murmur, gallop, rub.  No pedal edema  Breasts: On visual inspection the breasts are symmetrical.  Examination of the right breast demonstrates no discrete mass, skin change, or axillary adenopathy.  Examination of the left breast demonstrates no discrete mass, skin change, or axillary adenopathy.       Procedures     Results/Data:  Imaging: Mammogram and ultrasound reports and images from 7/23/27/24/24 were reviewed.  I agree with the assessment  Notes:   Lab:   Other: Pathology result was reviewed.    Assessment & Plan   Right breast asymmetric density with benign but discordant findings on core biopsy    Proceed with right breast biopsy with needle localization         Discussion/Summary: The risks of the surgical procedure were discussed.  Options of alternative treatments including no treatment (if applicable) were discussed.  Patient voiced understanding of the above issues and wishes to proceed    Time spent:     BMI is >= 25 and <30. (Overweight) The following options were offered after discussion;: exercise counseling/recommendations       No future appointments.      This document has been electronically signed by       August 1, 2024 10:10 EDT      Please note that portions of this note " were completed with a voice recognition program.

## 2024-08-01 NOTE — H&P (VIEW-ONLY)
Subjective   Julieta Soriano is a 58 y.o. female here today for pathology review.    History of Present Illness  Ms. Soriano was seen in the office today for breast evaluation.  Patient underwent a screening mammogram at an outside facility and was referred to the breast center for an asymmetric density in the right breast.  On 7/23/2024 the patient underwent a right diagnostic mammogram and right breast ultrasound which demonstrated an irregular asymmetry in the posterior right 9-10 o'clock with possible architectural distortion anteriorly and pleomorphic calcifications.  In aggregate the area spanned 4 cm of tissue.  No convincing correlate was identified on ultrasound.  Stereotactic biopsy was performed on 7/20/2024 which demonstrated benign concordant findings for which surgical biopsy was recommended.  Biopsy clip was noted to be medial to the biopsy site.  Patient denies a palpable mass or nipple discharge.  There is no prior history of breast biopsy or cyst aspiration.  No family history of breast or ovarian cancer.  The patient is postmenopausal and not on hormone replacement therapy.  No Known Allergies  Current Outpatient Medications   Medication Sig Dispense Refill    amLODIPine (NORVASC) 10 MG tablet Take 0.5 tablets by mouth Daily. for blood pressure 90 tablet 1    atorvastatin (LIPITOR) 20 MG tablet Take 1 tablet by mouth Daily. for cholesterol      carvedilol (COREG) 6.25 MG tablet TAKE ONE TABLET BY MOUTH TWO TIMES PER DAY FOR BLOOD PRESSURE      Cholecalciferol (Vitamin D3) 50 MCG (2000 UT) capsule Take 1 capsule by mouth Daily.      citalopram (CeleXA) 20 MG tablet Take 1 tablet by mouth Daily. as directed      loratadine (CLARITIN) 10 MG tablet Take 1 tablet by mouth Daily. For allergies      losartan-hydrochlorothiazide (HYZAAR) 100-12.5 MG per tablet TAKE ONE TABLET BY MOUTH EVERY DAY AS DIRECTED 60 tablet 3    Omeprazole 20 MG tablet delayed-release TAKE ONE TABLET BY MOUTH EVERY DAY FOR THE STOMACH    "    No current facility-administered medications for this visit.     Past Medical History:   Diagnosis Date    GERD (gastroesophageal reflux disease)     Hypertension     Sleep apnea      Past Surgical History:   Procedure Laterality Date    HYSTERECTOMY         Pertinent Review of Systems:  Respiratory: no shortness of breath  Cardiovascular: no chest pain  Other pertinent:      Objective   /84 (BP Location: Left arm)   Pulse 78   Ht 170.2 cm (67\")   Wt 85.9 kg (189 lb 6.4 oz)   BMI 29.66 kg/m²   Physical Exam  Well-developed well-nourished female  Neck:  No supraclavicular adenopathy  Lungs:  Respiratory effort normal. Auscultation: Clear, without wheezes, rhonchi, rales  Heart:  Regular rate and rhythm, without murmur, gallop, rub.  No pedal edema  Breasts: On visual inspection the breasts are symmetrical.  Examination of the right breast demonstrates no discrete mass, skin change, or axillary adenopathy.  Examination of the left breast demonstrates no discrete mass, skin change, or axillary adenopathy.       Procedures     Results/Data:  Imaging: Mammogram and ultrasound reports and images from 7/23/27/24/24 were reviewed.  I agree with the assessment  Notes:   Lab:   Other: Pathology result was reviewed.    Assessment & Plan   Right breast asymmetric density with benign but discordant findings on core biopsy    Proceed with right breast biopsy with needle localization         Discussion/Summary: The risks of the surgical procedure were discussed.  Options of alternative treatments including no treatment (if applicable) were discussed.  Patient voiced understanding of the above issues and wishes to proceed    Time spent:     BMI is >= 25 and <30. (Overweight) The following options were offered after discussion;: exercise counseling/recommendations       No future appointments.      This document has been electronically signed by       August 1, 2024 10:10 EDT      Please note that portions of this note " were completed with a voice recognition program.  This document has been electronically signed by Aidee LAST MD on August 1, 2024 10:53 EDT

## 2024-08-02 ENCOUNTER — PRE-ADMISSION TESTING (OUTPATIENT)
Dept: PREADMISSION TESTING | Facility: HOSPITAL | Age: 59
End: 2024-08-02
Payer: MEDICAID

## 2024-08-02 DIAGNOSIS — R92.8 ABNORMAL MAMMOGRAM: ICD-10-CM

## 2024-08-02 LAB
ANION GAP SERPL CALCULATED.3IONS-SCNC: 14.4 MMOL/L (ref 5–15)
BASOPHILS # BLD AUTO: 0.02 10*3/MM3 (ref 0–0.2)
BASOPHILS NFR BLD AUTO: 0.3 % (ref 0–1.5)
BUN SERPL-MCNC: 10 MG/DL (ref 6–20)
BUN/CREAT SERPL: 13 (ref 7–25)
CALCIUM SPEC-SCNC: 9.8 MG/DL (ref 8.6–10.5)
CHLORIDE SERPL-SCNC: 100 MMOL/L (ref 98–107)
CO2 SERPL-SCNC: 22.6 MMOL/L (ref 22–29)
CREAT SERPL-MCNC: 0.77 MG/DL (ref 0.57–1)
DEPRECATED RDW RBC AUTO: 41.4 FL (ref 37–54)
EGFRCR SERPLBLD CKD-EPI 2021: 90.1 ML/MIN/1.73
EOSINOPHIL # BLD AUTO: 0.04 10*3/MM3 (ref 0–0.4)
EOSINOPHIL NFR BLD AUTO: 0.7 % (ref 0.3–6.2)
ERYTHROCYTE [DISTWIDTH] IN BLOOD BY AUTOMATED COUNT: 12 % (ref 12.3–15.4)
GLUCOSE SERPL-MCNC: 118 MG/DL (ref 65–99)
HCT VFR BLD AUTO: 43.8 % (ref 34–46.6)
HGB BLD-MCNC: 15.6 G/DL (ref 12–15.9)
IMM GRANULOCYTES # BLD AUTO: 0.02 10*3/MM3 (ref 0–0.05)
IMM GRANULOCYTES NFR BLD AUTO: 0.3 % (ref 0–0.5)
LYMPHOCYTES # BLD AUTO: 1.31 10*3/MM3 (ref 0.7–3.1)
LYMPHOCYTES NFR BLD AUTO: 21.5 % (ref 19.6–45.3)
MCH RBC QN AUTO: 34 PG (ref 26.6–33)
MCHC RBC AUTO-ENTMCNC: 35.6 G/DL (ref 31.5–35.7)
MCV RBC AUTO: 95.4 FL (ref 79–97)
MONOCYTES # BLD AUTO: 0.38 10*3/MM3 (ref 0.1–0.9)
MONOCYTES NFR BLD AUTO: 6.2 % (ref 5–12)
NEUTROPHILS NFR BLD AUTO: 4.32 10*3/MM3 (ref 1.7–7)
NEUTROPHILS NFR BLD AUTO: 71 % (ref 42.7–76)
NRBC BLD AUTO-RTO: 0 /100 WBC (ref 0–0.2)
PLATELET # BLD AUTO: 194 10*3/MM3 (ref 140–450)
PMV BLD AUTO: 10.2 FL (ref 6–12)
POTASSIUM SERPL-SCNC: 3.5 MMOL/L (ref 3.5–5.2)
RBC # BLD AUTO: 4.59 10*6/MM3 (ref 3.77–5.28)
SODIUM SERPL-SCNC: 137 MMOL/L (ref 136–145)
WBC NRBC COR # BLD AUTO: 6.09 10*3/MM3 (ref 3.4–10.8)

## 2024-08-02 PROCEDURE — 36415 COLL VENOUS BLD VENIPUNCTURE: CPT

## 2024-08-02 PROCEDURE — 80048 BASIC METABOLIC PNL TOTAL CA: CPT

## 2024-08-02 PROCEDURE — 85025 COMPLETE CBC W/AUTO DIFF WBC: CPT

## 2024-08-02 NOTE — DISCHARGE INSTRUCTIONS
Please discontinue all blood thinners and anticoagulants (except aspirin) prior to surgery as per your surgeon and cardiologist instructions.  Aspirin may be continued up to the day prior to surgery.    HOLD all diabetic medications the morning of surgery as order by physician.    Please follow instructions on use of prep cloths provided by nurse. Return instruction sheet to pre-op nurse on day of surgery.    Arrival time for surgery on  8/6/24 will be given to you by Dr. Pearson's  Office.    A RESPONSIBLE PERSON MUST REMAIN IN THE WAITING ROOM DURING YOUR PROCEDURE AND A RESPONSIBLE  MUST BE AVAILABLE UPON YOUR DISCHARGE.    General Instructions:    Do NOT eat or drink after midnight 8/5/24 which includes water, mints, or gum.  You may brush your teeth. Dental appliances that are removable must be taken out day of surgery.  Do NOT smoke, chew tobacco, or drink alcohol within 24 hours prior to surgery.  Bring medications in original bottles, any inhalers and if applicable your C-PAP/BI-PAP machine  Bring any papers given to you in the doctor’s office  Wear clean, comfortable clothes and socks  Do NOT wear contact lenses or make-up or dark nail polish.  Bring a case for your glasses if applicable.  Bring crutches or walker if applicable  Leave all other valuables and jewelry at home  If you were given a blood bank armband, continue to wear it until discharged.    Preventing a Surgical Site Infection:  Shower the night before surgery (unless instructed otherwise) using a fresh bar of anti-bacterial soap (such as Dial) and clean washcloth.  Dry with a clean towel and dress in clean clothing.  For 2 to 3 days before surgery, avoid shaving with a razor near where you will have surgery because the razor can irritate skin and make it easier to develop an infection.  Ask your surgeon if you will be receiving antibiotics prior to surgery.  Make sure you, your family, and all healthcare providers clean their hands with  soap and water or an alcohol-based hand  before caring for you or your wound.  If at all possible, quit smoking as many days before surgery as you can.    Day of Surgery:  Upon arrival, a pre-op nurse and anesthesiologist will review your health history, obtain vital signs, and answer questions you may have.  The only belongings needed at this time will be your home medications and if applicable you C-PAP/BI-PAP machine.  If you are staying overnight, your family can leave the rest of your belongings in the car and bring them to your room later.  A pre-op nurse will start an IV and you may receive medication in preparation for surgery.  Due to patient privacy and limited space, only one member of your family will be able to accompany you in the pre-op area.  While you are in surgery your family should notify the waiting room  if they leave the waiting room area and provide a contact number.  Please be aware that surgery does come with discomfort.  We want to make every effort to control your discomfort so please discuss any uncontrolled symptoms with your nurse.  Your doctor will most likely have prescribed pain medications.  If you are going home after surgery you will receive individualized written care instructions before being discharged.  A responsible adult must drive you to and from the hospital on the day of surgery and stay with you for 24 hours.  If you are staying overnight following surgery, you will be transported to your hospital room following the recovery period.

## 2024-08-05 ENCOUNTER — TELEPHONE (OUTPATIENT)
Dept: SURGERY | Facility: CLINIC | Age: 59
End: 2024-08-05
Payer: MEDICAID

## 2024-08-06 ENCOUNTER — ANESTHESIA EVENT (OUTPATIENT)
Dept: PERIOP | Facility: HOSPITAL | Age: 59
End: 2024-08-06
Payer: MEDICAID

## 2024-08-06 ENCOUNTER — HOSPITAL ENCOUNTER (OUTPATIENT)
Facility: HOSPITAL | Age: 59
Setting detail: HOSPITAL OUTPATIENT SURGERY
Discharge: HOME | End: 2024-08-06
Attending: SURGERY | Admitting: SURGERY
Payer: MEDICAID

## 2024-08-06 ENCOUNTER — HOSPITAL ENCOUNTER (OUTPATIENT)
Dept: MAMMOGRAPHY | Facility: HOSPITAL | Age: 59
Discharge: HOME | End: 2024-08-06
Payer: MEDICAID

## 2024-08-06 ENCOUNTER — ANESTHESIA (OUTPATIENT)
Dept: PERIOP | Facility: HOSPITAL | Age: 59
End: 2024-08-06
Payer: MEDICAID

## 2024-08-06 VITALS
BODY MASS INDEX: 30.44 KG/M2 | DIASTOLIC BLOOD PRESSURE: 78 MMHG | HEIGHT: 66 IN | HEART RATE: 74 BPM | TEMPERATURE: 98.1 F | SYSTOLIC BLOOD PRESSURE: 126 MMHG | OXYGEN SATURATION: 97 % | WEIGHT: 189.4 LBS | RESPIRATION RATE: 18 BRPM

## 2024-08-06 DIAGNOSIS — R92.8 ABNORMAL MAMMOGRAM: ICD-10-CM

## 2024-08-06 PROCEDURE — 25010000002 KETOROLAC TROMETHAMINE PER 15 MG: Performed by: NURSE ANESTHETIST, CERTIFIED REGISTERED

## 2024-08-06 PROCEDURE — 25010000002 CEFAZOLIN PER 500 MG: Performed by: SURGERY

## 2024-08-06 PROCEDURE — 25810000003 LACTATED RINGERS PER 1000 ML: Performed by: ANESTHESIOLOGY

## 2024-08-06 PROCEDURE — 25010000002 ONDANSETRON PER 1 MG: Performed by: NURSE ANESTHETIST, CERTIFIED REGISTERED

## 2024-08-06 PROCEDURE — 25010000002 BUPIVACAINE 0.5 % SOLUTION: Performed by: SURGERY

## 2024-08-06 PROCEDURE — 25010000002 PROPOFOL 200 MG/20ML EMULSION: Performed by: NURSE ANESTHETIST, CERTIFIED REGISTERED

## 2024-08-06 PROCEDURE — C1819 TISSUE LOCALIZATION-EXCISION: HCPCS

## 2024-08-06 PROCEDURE — 19125 EXCISION BREAST LESION: CPT | Performed by: SURGERY

## 2024-08-06 PROCEDURE — 25010000002 MIDAZOLAM PER 1 MG: Performed by: NURSE ANESTHETIST, CERTIFIED REGISTERED

## 2024-08-06 PROCEDURE — 25010000002 FENTANYL CITRATE (PF) 50 MCG/ML SOLUTION: Performed by: NURSE ANESTHETIST, CERTIFIED REGISTERED

## 2024-08-06 PROCEDURE — 19281 PERQ DEVICE BREAST 1ST IMAG: CPT | Performed by: RADIOLOGY

## 2024-08-06 PROCEDURE — L8000 MASTECTOMY BRA: HCPCS | Performed by: SURGERY

## 2024-08-06 RX ORDER — OXYCODONE HYDROCHLORIDE AND ACETAMINOPHEN 5; 325 MG/1; MG/1
1 TABLET ORAL ONCE AS NEEDED
Status: DISCONTINUED | OUTPATIENT
Start: 2024-08-06 | End: 2024-08-06 | Stop reason: HOSPADM

## 2024-08-06 RX ORDER — FENTANYL CITRATE 50 UG/ML
50 INJECTION, SOLUTION INTRAMUSCULAR; INTRAVENOUS
Status: DISCONTINUED | OUTPATIENT
Start: 2024-08-06 | End: 2024-08-06 | Stop reason: HOSPADM

## 2024-08-06 RX ORDER — LIDOCAINE HYDROCHLORIDE 20 MG/ML
INJECTION, SOLUTION EPIDURAL; INFILTRATION; INTRACAUDAL; PERINEURAL AS NEEDED
Status: DISCONTINUED | OUTPATIENT
Start: 2024-08-06 | End: 2024-08-06 | Stop reason: SURG

## 2024-08-06 RX ORDER — ONDANSETRON 2 MG/ML
4 INJECTION INTRAMUSCULAR; INTRAVENOUS AS NEEDED
Status: DISCONTINUED | OUTPATIENT
Start: 2024-08-06 | End: 2024-08-06 | Stop reason: HOSPADM

## 2024-08-06 RX ORDER — PROPOFOL 10 MG/ML
INJECTION, EMULSION INTRAVENOUS AS NEEDED
Status: DISCONTINUED | OUTPATIENT
Start: 2024-08-06 | End: 2024-08-06 | Stop reason: SURG

## 2024-08-06 RX ORDER — HYDROCODONE BITARTRATE AND ACETAMINOPHEN 10; 325 MG/1; MG/1
1 TABLET ORAL 4 TIMES DAILY PRN
Qty: 8 TABLET | Refills: 0 | Status: SHIPPED | OUTPATIENT
Start: 2024-08-06

## 2024-08-06 RX ORDER — IPRATROPIUM BROMIDE AND ALBUTEROL SULFATE 2.5; .5 MG/3ML; MG/3ML
3 SOLUTION RESPIRATORY (INHALATION) ONCE AS NEEDED
Status: DISCONTINUED | OUTPATIENT
Start: 2024-08-06 | End: 2024-08-06 | Stop reason: HOSPADM

## 2024-08-06 RX ORDER — FAMOTIDINE 10 MG/ML
INJECTION, SOLUTION INTRAVENOUS AS NEEDED
Status: DISCONTINUED | OUTPATIENT
Start: 2024-08-06 | End: 2024-08-06 | Stop reason: SURG

## 2024-08-06 RX ORDER — BUPIVACAINE HYDROCHLORIDE 5 MG/ML
INJECTION, SOLUTION PERINEURAL AS NEEDED
Status: DISCONTINUED | OUTPATIENT
Start: 2024-08-06 | End: 2024-08-06 | Stop reason: HOSPADM

## 2024-08-06 RX ORDER — ONDANSETRON 2 MG/ML
INJECTION INTRAMUSCULAR; INTRAVENOUS AS NEEDED
Status: DISCONTINUED | OUTPATIENT
Start: 2024-08-06 | End: 2024-08-06 | Stop reason: SURG

## 2024-08-06 RX ORDER — MIDAZOLAM HYDROCHLORIDE 1 MG/ML
INJECTION INTRAMUSCULAR; INTRAVENOUS AS NEEDED
Status: DISCONTINUED | OUTPATIENT
Start: 2024-08-06 | End: 2024-08-06 | Stop reason: SURG

## 2024-08-06 RX ORDER — SODIUM CHLORIDE 9 MG/ML
40 INJECTION, SOLUTION INTRAVENOUS AS NEEDED
Status: DISCONTINUED | OUTPATIENT
Start: 2024-08-06 | End: 2024-08-06 | Stop reason: HOSPADM

## 2024-08-06 RX ORDER — SODIUM CHLORIDE, SODIUM LACTATE, POTASSIUM CHLORIDE, CALCIUM CHLORIDE 600; 310; 30; 20 MG/100ML; MG/100ML; MG/100ML; MG/100ML
125 INJECTION, SOLUTION INTRAVENOUS ONCE
Status: COMPLETED | OUTPATIENT
Start: 2024-08-06 | End: 2024-08-06

## 2024-08-06 RX ORDER — MIDAZOLAM HYDROCHLORIDE 1 MG/ML
1 INJECTION INTRAMUSCULAR; INTRAVENOUS
Status: DISCONTINUED | OUTPATIENT
Start: 2024-08-06 | End: 2024-08-06 | Stop reason: HOSPADM

## 2024-08-06 RX ORDER — SODIUM CHLORIDE 0.9 % (FLUSH) 0.9 %
10 SYRINGE (ML) INJECTION AS NEEDED
Status: DISCONTINUED | OUTPATIENT
Start: 2024-08-06 | End: 2024-08-06 | Stop reason: HOSPADM

## 2024-08-06 RX ORDER — MEPERIDINE HYDROCHLORIDE 25 MG/ML
12.5 INJECTION INTRAMUSCULAR; INTRAVENOUS; SUBCUTANEOUS
Status: DISCONTINUED | OUTPATIENT
Start: 2024-08-06 | End: 2024-08-06 | Stop reason: HOSPADM

## 2024-08-06 RX ORDER — SODIUM CHLORIDE, SODIUM LACTATE, POTASSIUM CHLORIDE, CALCIUM CHLORIDE 600; 310; 30; 20 MG/100ML; MG/100ML; MG/100ML; MG/100ML
100 INJECTION, SOLUTION INTRAVENOUS ONCE AS NEEDED
Status: DISCONTINUED | OUTPATIENT
Start: 2024-08-06 | End: 2024-08-06 | Stop reason: HOSPADM

## 2024-08-06 RX ORDER — FENTANYL CITRATE 50 UG/ML
INJECTION, SOLUTION INTRAMUSCULAR; INTRAVENOUS AS NEEDED
Status: DISCONTINUED | OUTPATIENT
Start: 2024-08-06 | End: 2024-08-06 | Stop reason: SURG

## 2024-08-06 RX ORDER — KETOROLAC TROMETHAMINE 30 MG/ML
INJECTION, SOLUTION INTRAMUSCULAR; INTRAVENOUS AS NEEDED
Status: DISCONTINUED | OUTPATIENT
Start: 2024-08-06 | End: 2024-08-06 | Stop reason: SURG

## 2024-08-06 RX ORDER — KETOROLAC TROMETHAMINE 30 MG/ML
INJECTION, SOLUTION INTRAMUSCULAR; INTRAVENOUS AS NEEDED
Status: DISCONTINUED | OUTPATIENT
Start: 2024-08-06 | End: 2024-08-06

## 2024-08-06 RX ORDER — SODIUM CHLORIDE 0.9 % (FLUSH) 0.9 %
10 SYRINGE (ML) INJECTION EVERY 12 HOURS SCHEDULED
Status: DISCONTINUED | OUTPATIENT
Start: 2024-08-06 | End: 2024-08-06 | Stop reason: HOSPADM

## 2024-08-06 RX ADMIN — FAMOTIDINE 20 MG: 10 INJECTION, SOLUTION INTRAVENOUS at 11:55

## 2024-08-06 RX ADMIN — PROPOFOL 150 MG: 10 INJECTION, EMULSION INTRAVENOUS at 11:58

## 2024-08-06 RX ADMIN — LIDOCAINE HYDROCHLORIDE 60 MG: 20 INJECTION, SOLUTION EPIDURAL; INFILTRATION; INTRACAUDAL; PERINEURAL at 11:58

## 2024-08-06 RX ADMIN — FENTANYL CITRATE 100 MCG: 50 INJECTION INTRAMUSCULAR; INTRAVENOUS at 11:58

## 2024-08-06 RX ADMIN — SODIUM CHLORIDE, POTASSIUM CHLORIDE, SODIUM LACTATE AND CALCIUM CHLORIDE: 600; 310; 30; 20 INJECTION, SOLUTION INTRAVENOUS at 11:53

## 2024-08-06 RX ADMIN — ONDANSETRON 4 MG: 2 INJECTION INTRAMUSCULAR; INTRAVENOUS at 11:55

## 2024-08-06 RX ADMIN — CEFAZOLIN 2000 MG: 2 INJECTION, POWDER, FOR SOLUTION INTRAMUSCULAR; INTRAVENOUS at 11:55

## 2024-08-06 RX ADMIN — MIDAZOLAM HYDROCHLORIDE 2 MG: 1 INJECTION, SOLUTION INTRAMUSCULAR; INTRAVENOUS at 11:55

## 2024-08-06 RX ADMIN — KETOROLAC TROMETHAMINE 30 MG: 30 INJECTION, SOLUTION INTRAMUSCULAR; INTRAVENOUS at 12:40

## 2024-08-06 RX ADMIN — FENTANYL CITRATE 100 MCG: 50 INJECTION INTRAMUSCULAR; INTRAVENOUS at 12:43

## 2024-08-06 NOTE — ANESTHESIA PREPROCEDURE EVALUATION
Anesthesia Evaluation                  Airway   Mallampati: II  TM distance: >3 FB  Neck ROM: full  No difficulty expected  Dental - normal exam     Pulmonary - normal exam   (+) ,shortness of breath, sleep apnea  Cardiovascular - normal exam    (+) hypertension, hyperlipidemia      Neuro/Psych  (+) psychiatric history  GI/Hepatic/Renal/Endo    (+) GERD    Musculoskeletal     Abdominal  - normal exam    Bowel sounds: normal.   Substance History      OB/GYN          Other                          Anesthesia Plan    ASA 2     general     intravenous induction     Anesthetic plan, risks, benefits, and alternatives have been provided, discussed and informed consent has been obtained with: patient.        CODE STATUS:

## 2024-08-06 NOTE — OP NOTE
Right breast biopsy with wire localization and specimen x-ray    Surgeon:  Aidee Pearson M.D., [email or web address]    Assistant: Audelia    Pre-op: Normal mammogram right breast    Post-op:  Same    Anesthesia:  General    Indications: Abnormal mammogram right breast       Procedure Details   After obtaining informed consent and with venous compression boots in place and receiving preoperative antibiotics the patient was taken to the operating room and placed under anesthesia. The right breast was prepped and draped in a sterile fashion.  Needle localization images were reviewed prior to the procedure.  A transverse incision was made in the 9 o'clock position and carried down into the breast tissue.  The more anterior wire was identified and dissection was carried out superior and inferior to this.  Dissection was then carried deep until the posterior wire was identified and the tissue in between the 2 wires was removed..  A small piece of additional posterior medial tissue was removed and sent as a separate specimen  The wound was irrigated and hemostasis was obtained.  The incision was closed in a several layer closure of deep 0 Vicryl sutures, followed by 3-0 Vicryl subdermal sutures and 4-0 Monocryl subcuticular suture.    Findings:  Specimen was inked for orientation.    Estimated Blood Loss:  Minimal    Blood administered:            Drains: none           Specimens: A: Right breast tissue     Grafts and Implants: None        Complications:  none           Disposition: PACU - hemodynamically stable.           Condition: stable

## 2024-08-06 NOTE — DISCHARGE INSTRUCTIONS
Due to the sedation you had today, you can not drive or sign any legal documents for 24 hour. You can eat when the leave the hospital today, just start with a smaller bland meal and advance as your stomach tolerates it. You have a bandaid and bra over your incision site that has to stay clean and dry for 48 hours. Wear the bra even to sleep in for the 48 hours. After 48 hour you can remove the bra and bandaid. There will be steri strips under the bandaid, leave the steri strip in place. Do not pick at the steri strips, they will fall off on its own in 7-10 Days. Shower daily letting the warm soapy water run over the incision and then pat dry. However, do not soak in water, such as, taking a bath or going swimming for two weeks.Take your medication directed. Follow up as scheduled. Call your doctors office if you notice any bleeding that is not controled with pressure or any signs of infection, such as, increase redness, red streaks, swelling, pain that gets worse instead of better, or fever above 101. Call Dr. Pearson's office if you have any questions or concerns.

## 2024-08-06 NOTE — ANESTHESIA POSTPROCEDURE EVALUATION
Patient: Julieta Soriano    Procedure Summary       Date: 08/06/24 Room / Location: [facility] OR 01 /  [facility] OR    Anesthesia Start: 1153 Anesthesia Stop: 1300    Procedures:       BREAST BIOPSY WITH NEEDLE LOCALIZATION (Right: Breast)      INTRAOPERATIVE ULTRASOUND (Right) Diagnosis:       Abnormal mammogram      (Abnormal mammogram [R92.8])    Surgeons: Aidee Pearson MD Provider: Simone Jones MD    Anesthesia Type: general ASA Status: 2            Anesthesia Type: general    Vitals  Vitals Value Taken Time   /83 08/06/24 1331   Temp 97.6 °F (36.4 °C) 08/06/24 1301   Pulse 75 08/06/24 1331   Resp 20 08/06/24 1331   SpO2 94 % 08/06/24 1331           Post Anesthesia Care and Evaluation    Patient location during evaluation: PHASE II  Patient participation: complete - patient participated  Level of consciousness: awake and alert  Pain score: 1  Pain management: adequate    Airway patency: patent  Anesthetic complications: No anesthetic complications  PONV Status: controlled  Cardiovascular status: acceptable  Respiratory status: acceptable  Hydration status: acceptable

## 2024-08-06 NOTE — ANESTHESIA PROCEDURE NOTES
Airway  Urgency: elective    Date/Time: 8/6/2024 11:58 AM    General Information and Staff    Patient location during procedure: OR  CRNA/CAA: Jocelyn Donovan CRNA    Indications and Patient Condition    Preoxygenated: yes  Mask difficulty assessment: 1 - vent by mask    Final Airway Details  Final airway type: supraglottic airway      Successful airway: unique  Size 4     Number of attempts at approach: 1  Assessment: lips, teeth, and gum same as pre-op and atraumatic intubation

## 2024-08-08 LAB — REF LAB TEST METHOD: NORMAL

## 2024-08-12 ENCOUNTER — TELEPHONE (OUTPATIENT)
Dept: SURGERY | Facility: CLINIC | Age: 59
End: 2024-08-12
Payer: MEDICAID

## 2024-08-13 ENCOUNTER — TELEPHONE (OUTPATIENT)
Dept: SURGERY | Facility: CLINIC | Age: 59
End: 2024-08-13
Payer: MEDICAID

## 2024-08-13 NOTE — TELEPHONE ENCOUNTER
Informed patient that Pathology showed no malignancy. Patient states that she feels like she has a yeast infection. Called in Diflucan 150mg #2 One now and repeat in 3 days if needed.

## 2024-08-16 LAB — REF LAB TEST METHOD: NORMAL

## 2024-08-26 ENCOUNTER — OFFICE VISIT (OUTPATIENT)
Dept: SURGERY | Facility: CLINIC | Age: 59
End: 2024-08-26
Payer: MEDICAID

## 2024-08-26 VITALS
HEIGHT: 66 IN | HEART RATE: 78 BPM | BODY MASS INDEX: 30.73 KG/M2 | SYSTOLIC BLOOD PRESSURE: 126 MMHG | DIASTOLIC BLOOD PRESSURE: 82 MMHG | WEIGHT: 191.2 LBS

## 2024-08-26 DIAGNOSIS — Z09 POSTOP CHECK: ICD-10-CM

## 2024-08-26 DIAGNOSIS — R92.8 ABNORMAL MAMMOGRAM: Primary | ICD-10-CM

## 2024-08-26 PROCEDURE — 1160F RVW MEDS BY RX/DR IN RCRD: CPT | Performed by: SURGERY

## 2024-08-26 PROCEDURE — 1159F MED LIST DOCD IN RCRD: CPT | Performed by: SURGERY

## 2024-08-26 PROCEDURE — 3074F SYST BP LT 130 MM HG: CPT | Performed by: SURGERY

## 2024-08-26 PROCEDURE — 3079F DIAST BP 80-89 MM HG: CPT | Performed by: SURGERY

## 2024-08-26 PROCEDURE — 99024 POSTOP FOLLOW-UP VISIT: CPT | Performed by: SURGERY

## 2024-08-26 NOTE — PROGRESS NOTES
"Subjective   Julieta Soriano is a 58 y.o. female here today for post op.    History of Present Illness  Ms. Soriano was seen in the office today for her first post op visit following a right breast biopsy on 8/6/2024.  Final pathology demonstrated benign findings.  Patient voices no complaints..  No Known Allergies      Current Outpatient Medications   Medication Sig Dispense Refill    amLODIPine (NORVASC) 10 MG tablet Take 0.5 tablets by mouth Daily. for blood pressure 90 tablet 1    atorvastatin (LIPITOR) 20 MG tablet Take 1 tablet by mouth Daily. for cholesterol      carvedilol (COREG) 6.25 MG tablet TAKE ONE TABLET BY MOUTH TWO TIMES PER DAY FOR BLOOD PRESSURE      Cholecalciferol (Vitamin D3) 50 MCG (2000 UT) capsule Take 1 capsule by mouth Daily.      citalopram (CeleXA) 20 MG tablet Take 1 tablet by mouth Daily. as directed      loratadine (CLARITIN) 10 MG tablet Take 1 tablet by mouth Daily. For allergies      losartan-hydrochlorothiazide (HYZAAR) 100-12.5 MG per tablet TAKE ONE TABLET BY MOUTH EVERY DAY AS DIRECTED 60 tablet 3    Omeprazole 20 MG tablet delayed-release TAKE ONE TABLET BY MOUTH EVERY DAY FOR THE STOMACH       No current facility-administered medications for this visit.       Objective   /82 (BP Location: Left arm)   Pulse 78   Ht 167.6 cm (66\")   Wt 86.7 kg (191 lb 3.2 oz)   BMI 30.86 kg/m²    Physical Exam  Right breast: Healing surgical scar in the upper outer quadrant with mild postop change    Results/Data  Pathology results were reviewed and discussed with the patient    Procedures     Assessment & Plan   Stable course, status post right breast excisional biopsy    Patient advised to follow breast center recommendations regarding follow-up imaging.  Follow up with me as needed       Discussion/Summary    BMI is >= 30 and <35. (Class 1 Obesity). The following options were offered after discussion;: nutrition counseling/recommendations       No future appointments.      This document " has been electronically signed by Blank Haddad MA   August 26, 2024 14:29 EDT      Please note that portions of this note were completed with a voice recognition program.

## 2025-06-19 ENCOUNTER — TRANSCRIBE ORDERS (OUTPATIENT)
Dept: ADMINISTRATIVE | Facility: HOSPITAL | Age: 60
End: 2025-06-19
Payer: MEDICAID

## 2025-06-19 DIAGNOSIS — R74.8 ELEVATED LIVER ENZYMES: Primary | ICD-10-CM

## 2025-06-24 ENCOUNTER — TRANSCRIBE ORDERS (OUTPATIENT)
Dept: ADMINISTRATIVE | Facility: HOSPITAL | Age: 60
End: 2025-06-24
Payer: MEDICAID

## 2025-06-24 DIAGNOSIS — F17.210 CIGARETTE SMOKER: Primary | ICD-10-CM

## 2025-07-15 ENCOUNTER — HOSPITAL ENCOUNTER (OUTPATIENT)
Dept: CT IMAGING | Facility: HOSPITAL | Age: 60
Discharge: HOME | End: 2025-07-15
Payer: MEDICAID

## 2025-07-15 ENCOUNTER — HOSPITAL ENCOUNTER (OUTPATIENT)
Dept: ULTRASOUND IMAGING | Facility: HOSPITAL | Age: 60
Discharge: HOME | End: 2025-07-15
Payer: MEDICAID

## 2025-07-15 DIAGNOSIS — F17.210 CIGARETTE SMOKER: ICD-10-CM

## 2025-07-15 DIAGNOSIS — R74.8 ELEVATED LIVER ENZYMES: ICD-10-CM

## 2025-07-15 PROCEDURE — 76700 US EXAM ABDOM COMPLETE: CPT

## 2025-07-15 PROCEDURE — 71271 CT THORAX LUNG CANCER SCR C-: CPT

## 2025-07-15 PROCEDURE — 71271 CT THORAX LUNG CANCER SCR C-: CPT | Performed by: RADIOLOGY

## 2025-07-15 PROCEDURE — 76700 US EXAM ABDOM COMPLETE: CPT | Performed by: RADIOLOGY

## 2025-07-30 ENCOUNTER — HOSPITAL ENCOUNTER (OUTPATIENT)
Dept: MAMMOGRAPHY | Facility: HOSPITAL | Age: 60
Discharge: HOME | End: 2025-07-30
Admitting: RADIOLOGY
Payer: MEDICAID

## 2025-07-30 DIAGNOSIS — R92.8 ABNORMAL MAMMOGRAM: ICD-10-CM

## 2025-07-30 PROCEDURE — 77066 DX MAMMO INCL CAD BI: CPT | Performed by: RADIOLOGY

## 2025-07-30 PROCEDURE — 77066 DX MAMMO INCL CAD BI: CPT

## 2025-07-30 PROCEDURE — G0279 TOMOSYNTHESIS, MAMMO: HCPCS

## 2025-07-30 PROCEDURE — 77062 BREAST TOMOSYNTHESIS BI: CPT | Performed by: RADIOLOGY

## (undated) DEVICE — VIOLET BRAIDED (POLYGLACTIN 910), SYNTHETIC ABSORBABLE SUTURE: Brand: COATED VICRYL

## (undated) DEVICE — DRAPE,T,LAPARO,TRANS,STERILE: Brand: MEDLINE

## (undated) DEVICE — UNDYED BRAIDED (POLYGLACTIN 910), SYNTHETIC ABSORBABLE SUTURE: Brand: COATED VICRYL

## (undated) DEVICE — PK BASIC 70

## (undated) DEVICE — KT INK TISS MARGINMARKER STD 6COLOR

## (undated) DEVICE — GLV SURG PREMIERPRO MIC LTX PF SZ8 BRN

## (undated) DEVICE — HOLDER: Brand: DEROYAL

## (undated) DEVICE — SUT MNCRYL PLS ANTIB UD 4/0 PS2 18IN

## (undated) DEVICE — PATIENT RETURN ELECTRODE, SINGLE-USE, CONTACT QUALITY MONITORING, ADULT, WITH 9FT CORD, FOR PATIENTS WEIGING OVER 33LBS. (15KG): Brand: MEGADYNE

## (undated) DEVICE — DEV TRNSPEC

## (undated) DEVICE — SPNG LAP PREWSH SFTPK 18X18IN STRL PK/5

## (undated) DEVICE — ELECTRD BLD EZ CLN MOD 4IN

## (undated) DEVICE — CVR PROB ULTRASND CIVFLEX GEN/PURP TELESCP/FOLD 5.5X58IN LF

## (undated) DEVICE — AMD ANTIMICROBIAL NON-ADHERENT ISLAND DRESSING,0.2% POLYHEXAMETHYLENE BIGUANIDE HCI (PHMB): Brand: TELFA

## (undated) DEVICE — GLV SURG SENSICARE W/ALOE PF LF 7.5 STRL

## (undated) DEVICE — BRA RADITON/THERP THERAPORT SER2161 FRNT/CLS HK/LP 2XL WHT

## (undated) DEVICE — SHEET,DRAPE,53X77,STERILE: Brand: MEDLINE

## (undated) DEVICE — SUT SILK 2/0 SH 30IN K833H

## (undated) DEVICE — SUT VIC 3/0 SH 27IN J416H